# Patient Record
Sex: FEMALE | Race: WHITE | NOT HISPANIC OR LATINO | Employment: FULL TIME | URBAN - METROPOLITAN AREA
[De-identification: names, ages, dates, MRNs, and addresses within clinical notes are randomized per-mention and may not be internally consistent; named-entity substitution may affect disease eponyms.]

---

## 2017-08-29 ENCOUNTER — OFFICE VISIT (OUTPATIENT)
Dept: MEDICAL GROUP | Facility: MEDICAL CENTER | Age: 47
End: 2017-08-29
Payer: COMMERCIAL

## 2017-08-29 VITALS
DIASTOLIC BLOOD PRESSURE: 74 MMHG | HEIGHT: 61 IN | RESPIRATION RATE: 14 BRPM | HEART RATE: 60 BPM | OXYGEN SATURATION: 97 % | TEMPERATURE: 98 F | SYSTOLIC BLOOD PRESSURE: 122 MMHG | BODY MASS INDEX: 37.34 KG/M2 | WEIGHT: 197.8 LBS

## 2017-08-29 DIAGNOSIS — Z12.31 VISIT FOR SCREENING MAMMOGRAM: ICD-10-CM

## 2017-08-29 DIAGNOSIS — M79.89 BILATERAL SWELLING OF FEET: ICD-10-CM

## 2017-08-29 DIAGNOSIS — Z80.3 FAMILY HISTORY OF BREAST CANCER: ICD-10-CM

## 2017-08-29 DIAGNOSIS — Z76.89 ENCOUNTER TO ESTABLISH CARE: ICD-10-CM

## 2017-08-29 DIAGNOSIS — E66.9 OBESITY (BMI 35.0-39.9 WITHOUT COMORBIDITY): ICD-10-CM

## 2017-08-29 DIAGNOSIS — Z00.00 PREVENTATIVE HEALTH CARE: ICD-10-CM

## 2017-08-29 PROCEDURE — 99204 OFFICE O/P NEW MOD 45 MIN: CPT | Performed by: PHYSICIAN ASSISTANT

## 2017-08-29 RX ORDER — FUROSEMIDE 20 MG/1
20 TABLET ORAL DAILY
Qty: 28 TAB | Refills: 0 | Status: SHIPPED | OUTPATIENT
Start: 2017-08-29 | End: 2020-08-28

## 2017-08-29 ASSESSMENT — PAIN SCALES - GENERAL: PAINLEVEL: 5=MODERATE PAIN

## 2017-08-29 ASSESSMENT — PATIENT HEALTH QUESTIONNAIRE - PHQ9: CLINICAL INTERPRETATION OF PHQ2 SCORE: 0

## 2017-08-29 NOTE — PROGRESS NOTES
"Subjective:   Desiree Pratt is a 47 y.o. female here today forEstablishing care and for bilateral feet swelling for about 2 weeks.    Bilateral swelling of feet  This is a 47-year-old female complains of an approximately two-week history of bilateral feet swelling. Associated pain. She works most of the day. She missed work yesterday and today. She's been elevating her feet. She is exercising regularly. Denies any chest pain or shortness of breath. No coughing. No history of any medical disorders.    Family history of breast cancer  Mother  at 49 from breast cancer.       Current medicines (including changes today)  Current Outpatient Prescriptions   Medication Sig Dispense Refill   • Cyanocobalamin (VITAMELTS ENERGY VITAMIN B-12 PO) Take  by mouth.     • Ferrous Gluconate (IRON 27 PO) Take  by mouth.     • furosemide (LASIX) 20 MG Tab Take 1 Tab by mouth every day. 28 Tab 0     No current facility-administered medications for this visit.      She  has no past medical history on file.    ROS   No chest pain, no shortness of breath, no abdominal pain and all other systems were reviewed and are negative.       Objective:     Blood pressure 122/74, pulse 60, temperature 36.7 °C (98 °F), resp. rate 14, height 1.549 m (5' 1\"), weight 89.7 kg (197 lb 12.8 oz), last menstrual period 2017, SpO2 97 %, not currently breastfeeding. Body mass index is 37.37 kg/m².   Physical Exam:  Constitutional: Alert, no distress.  Skin: Warm, dry, good turgor, no rashes in visible areas.  Eye: Equal, round and reactive, conjunctiva clear, lids normal.  ENMT: Lips without lesions, good dentition, oropharynx clear.  Neck: Trachea midline, no masses.   Lymph: No cervical or supraclavicular lymphadenopathy  Respiratory: Unlabored respiratory effort, lungs clear to auscultation, no wheezes, no ronchi.  Cardiovascular: Normal S1, S2, no murmur, no Significant edema. No pitting edema.  Abdomen: Soft, non-tender, no masses.  Psych: Alert " and oriented x3, normal affect and mood.        Assessment and Plan:   The following treatment plan was discussed    1. Bilateral swelling of feet  Acute, new onset condition. No other symptoms noted. Advised to continue exercise routinely. Elevate as needed. Follow up with any worsening symptoms such as shortness of breath or coughing. Ordered labs fasting. Also provided Lasix 20 mg daily for 7 days in case edema returns. May continue also compression stockings.  - TSH; Future  - furosemide (LASIX) 20 MG Tab; Take 1 Tab by mouth every day.  Dispense: 28 Tab; Refill: 0    2. Encounter to establish care    3. Visit for screening mammogram  Ordered mammogram.  - MA-SCREEN MAMMO W/CAD-BILAT    4. Obesity (BMI 35.0-39.9 without comorbidity) ([facility])  Advised continued exercise. Ordered TSH.  - Patient identified as having weight management issue.  Appropriate orders and counseling given.  - TSH; Future    5. Preventative health care  Order labs fasting.  - COMP METABOLIC PANEL; Future  - HEMOGLOBIN A1C; Future  - LIPID PANEL  - TSH; Future  - CBC WITHOUT DIFFERENTIAL; Future    6. Family history of breast cancer  Obtain mammogram yearly.      Followup: Return if symptoms worsen or fail to improve.    Please note that this dictation was created using voice recognition software. I have made every reasonable attempt to correct obvious errors, but I expect that there are errors of grammar and possibly content that I did not discover before finalizing the note.

## 2017-08-29 NOTE — ASSESSMENT & PLAN NOTE
This is a 47-year-old female complains of an approximately two-week history of bilateral feet swelling. Associated pain. She works most of the day. She missed work yesterday and today. She's been elevating her feet. She is exercising regularly. Denies any chest pain or shortness of breath. No coughing. No history of any medical disorders.

## 2017-08-29 NOTE — LETTER
August 29, 2017         Patient: Desiree Pratt   YOB: 1970   Date of Visit: 8/29/2017           To Whom it May Concern:    Desiree Pratt was seen in my clinic on 8/29/2017. She may return to work on 8/30/17.    If you have any questions or concerns, please don't hesitate to call.        Sincerely,           Rashid Santiago P.A.-C.  Electronically Signed

## 2018-01-24 ENCOUNTER — OFFICE VISIT (OUTPATIENT)
Dept: URGENT CARE | Facility: PHYSICIAN GROUP | Age: 48
End: 2018-01-24
Payer: COMMERCIAL

## 2018-01-24 VITALS
SYSTOLIC BLOOD PRESSURE: 124 MMHG | OXYGEN SATURATION: 97 % | TEMPERATURE: 98 F | DIASTOLIC BLOOD PRESSURE: 74 MMHG | HEART RATE: 62 BPM | WEIGHT: 198 LBS | HEIGHT: 61 IN | BODY MASS INDEX: 37.38 KG/M2

## 2018-01-24 DIAGNOSIS — J01.40 ACUTE NON-RECURRENT PANSINUSITIS: ICD-10-CM

## 2018-01-24 DIAGNOSIS — H61.23 BILATERAL IMPACTED CERUMEN: ICD-10-CM

## 2018-01-24 PROCEDURE — 99214 OFFICE O/P EST MOD 30 MIN: CPT | Performed by: NURSE PRACTITIONER

## 2018-01-24 RX ORDER — DOXYCYCLINE HYCLATE 100 MG
100 TABLET ORAL 2 TIMES DAILY
Qty: 14 TAB | Refills: 0 | Status: SHIPPED | OUTPATIENT
Start: 2018-01-24 | End: 2018-01-31

## 2018-01-24 ASSESSMENT — ENCOUNTER SYMPTOMS
FEVER: 0
COUGH: 1
EYE PAIN: 0
DIZZINESS: 0
WHEEZING: 0
SHORTNESS OF BREATH: 1
NAUSEA: 0
SORE THROAT: 1
CHILLS: 1
MYALGIAS: 0
VOMITING: 0

## 2018-01-25 NOTE — PROGRESS NOTES
Subjective:     Desiree De La Paz is a 47 y.o. female who presents for Cough (and congestion X 2 months   )  Patient complaining of an on and off again hold since November. Patient complaining of congestion, headaches, fatigue, shortness of breath, cough. Patient denies any fevers, nausea, vomiting.     Cough   This is a new problem. The current episode started more than 1 month ago. The problem has been waxing and waning. The problem occurs every few hours. The cough is non-productive. Associated symptoms include chills, nasal congestion, a sore throat and shortness of breath. Pertinent negatives include no chest pain, fever, myalgias, rash or wheezing. Nothing aggravates the symptoms. She has tried OTC cough suppressant for the symptoms. The treatment provided no relief. There is no history of bronchitis or pneumonia.   History reviewed. No pertinent past medical history.History reviewed. No pertinent surgical history.  Social History     Social History   • Marital status:      Spouse name: N/A   • Number of children: N/A   • Years of education: N/A     Occupational History   • Not on file.     Social History Main Topics   • Smoking status: Never Smoker   • Smokeless tobacco: Never Used   • Alcohol use No   • Drug use: No   • Sexual activity: Not Currently     Partners: Male     Other Topics Concern   • Not on file     Social History Narrative   • No narrative on file      Family History   Problem Relation Age of Onset   • Cancer Mother 49     Breast CA   • Heart Disease Father     Review of Systems   Constitutional: Positive for chills and malaise/fatigue. Negative for fever.   HENT: Positive for congestion and sore throat.    Eyes: Negative for pain.   Respiratory: Positive for cough and shortness of breath. Negative for wheezing.    Cardiovascular: Negative for chest pain.   Gastrointestinal: Negative for nausea and vomiting.   Genitourinary: Negative for hematuria.   Musculoskeletal: Negative for  "myalgias.   Skin: Negative for rash.   Neurological: Negative for dizziness.   No Known Allergies   Objective:   /74   Pulse 62   Temp 36.7 °C (98 °F)   Ht 1.549 m (5' 1\")   Wt 89.8 kg (198 lb)   SpO2 97%   BMI 37.41 kg/m²   Physical Exam   Constitutional: She is oriented to person, place, and time. She appears well-developed and well-nourished. No distress.   HENT:   Head: Normocephalic and atraumatic.   Right Ear: Tympanic membrane normal. Tympanic membrane is not erythematous.   Left Ear: Tympanic membrane normal. Tympanic membrane is not erythematous.   Nose: Mucosal edema present. Right sinus exhibits maxillary sinus tenderness and frontal sinus tenderness. Left sinus exhibits maxillary sinus tenderness and frontal sinus tenderness.   Mouth/Throat: Uvula is midline, oropharynx is clear and moist and mucous membranes are normal. No posterior oropharyngeal edema, posterior oropharyngeal erythema or tonsillar abscesses. No tonsillar exudate.   Eyes: Conjunctivae and EOM are normal. Pupils are equal, round, and reactive to light. Right eye exhibits no discharge. Left eye exhibits no discharge.   Cardiovascular: Normal rate and regular rhythm.    No murmur heard.  Pulmonary/Chest: Effort normal and breath sounds normal. No respiratory distress.   Abdominal: Soft. She exhibits no distension. There is no tenderness.   Neurological: She is alert and oriented to person, place, and time. She has normal reflexes. No sensory deficit.   Skin: Skin is warm, dry and intact.   Psychiatric: She has a normal mood and affect.         Assessment/Plan:   Assessment    1. Bilateral impacted cerumen     2. Acute non-recurrent pansinusitis  doxycycline (VIBRAMYCIN) 100 MG Tab     Procedure: Cerumen Removal  Risks and benefits of procedure discussed  Cerumen removed with curette and lavage after softening agent instilled  Patient tolerated well  Post procedure exam with clear canal and normal TM    Lung sounds clear to " auscultation bilaterally, patient without fevers, PO2 adequate.  Advised to continue supportive care with Tylenol and/or ibuprofen for fevers and discomfort. Increased fluids and electrolytes.      Patient given precautionary s/sx that mandate immediate follow up and evaluation in the ED. Advised of risks of not doing so.    DDX, Supportive care, and indications for immediate follow-up discussed with patient.    Instructed to return to clinic or nearest emergency department if we are not available for any change in condition, further concerns, or worsening of symptoms.    The patient demonstrated a good understanding and agreed with the treatment plan.

## 2020-05-23 ENCOUNTER — OFFICE VISIT (OUTPATIENT)
Dept: URGENT CARE | Facility: CLINIC | Age: 50
End: 2020-05-23
Payer: COMMERCIAL

## 2020-05-23 VITALS
OXYGEN SATURATION: 97 % | HEART RATE: 78 BPM | SYSTOLIC BLOOD PRESSURE: 132 MMHG | HEIGHT: 61 IN | TEMPERATURE: 97.4 F | RESPIRATION RATE: 14 BRPM | BODY MASS INDEX: 33.99 KG/M2 | WEIGHT: 180 LBS | DIASTOLIC BLOOD PRESSURE: 80 MMHG

## 2020-05-23 DIAGNOSIS — J02.9 PHARYNGITIS, UNSPECIFIED ETIOLOGY: Primary | ICD-10-CM

## 2020-05-23 PROCEDURE — 99213 OFFICE O/P EST LOW 20 MIN: CPT | Performed by: FAMILY MEDICINE

## 2020-05-23 RX ORDER — AMOXICILLIN 500 MG/1
500 CAPSULE ORAL 2 TIMES DAILY
Qty: 20 CAP | Refills: 0 | Status: SHIPPED | OUTPATIENT
Start: 2020-05-23 | End: 2020-08-28

## 2020-05-23 ASSESSMENT — ENCOUNTER SYMPTOMS
NECK PAIN: 0
CONSTITUTIONAL NEGATIVE: 1
VOMITING: 0
STRIDOR: 0
COUGH: 0
HOARSE VOICE: 0
HEADACHES: 0
TROUBLE SWALLOWING: 0
DIARRHEA: 0
ABDOMINAL PAIN: 0
SWOLLEN GLANDS: 1
SHORTNESS OF BREATH: 0
SORE THROAT: 1

## 2020-05-23 NOTE — PATIENT INSTRUCTIONS
Pharyngitis  Pharyngitis is redness, pain, and swelling (inflammation) of your pharynx.  What are the causes?  Pharyngitis is usually caused by infection. Most of the time, these infections are from viruses (viral) and are part of a cold. However, sometimes pharyngitis is caused by bacteria (bacterial). Pharyngitis can also be caused by allergies. Viral pharyngitis may be spread from person to person by coughing, sneezing, and personal items or utensils (cups, forks, spoons, toothbrushes). Bacterial pharyngitis may be spread from person to person by more intimate contact, such as kissing.  What are the signs or symptoms?  Symptoms of pharyngitis include:  · Sore throat.  · Tiredness (fatigue).  · Low-grade fever.  · Headache.  · Joint pain and muscle aches.  · Skin rashes.  · Swollen lymph nodes.  · Plaque-like film on throat or tonsils (often seen with bacterial pharyngitis).  How is this diagnosed?  Your health care provider will ask you questions about your illness and your symptoms. Your medical history, along with a physical exam, is often all that is needed to diagnose pharyngitis. Sometimes, a rapid strep test is done. Other lab tests may also be done, depending on the suspected cause.  How is this treated?  Viral pharyngitis will usually get better in 3-4 days without the use of medicine. Bacterial pharyngitis is treated with medicines that kill germs (antibiotics).  Follow these instructions at home:  · Drink enough water and fluids to keep your urine clear or pale yellow.  · Only take over-the-counter or prescription medicines as directed by your health care provider:  ¨ If you are prescribed antibiotics, make sure you finish them even if you start to feel better.  ¨ Do not take aspirin.  · Get lots of rest.  · Gargle with 8 oz of salt water (½ tsp of salt per 1 qt of water) as often as every 1-2 hours to soothe your throat.  · Throat lozenges (if you are not at risk for choking) or sprays may be used to  soothe your throat.  Contact a health care provider if:  · You have large, tender lumps in your neck.  · You have a rash.  · You cough up green, yellow-brown, or bloody spit.  Get help right away if:  · Your neck becomes stiff.  · You drool or are unable to swallow liquids.  · You vomit or are unable to keep medicines or liquids down.  · You have severe pain that does not go away with the use of recommended medicines.  · You have trouble breathing (not caused by a stuffy nose).  This information is not intended to replace advice given to you by your health care provider. Make sure you discuss any questions you have with your health care provider.  Document Released: 12/18/2006 Document Revised: 05/25/2017 Document Reviewed: 08/25/2014  [employer] Interactive Patient Education © 2017 [employer]

## 2020-05-23 NOTE — PROGRESS NOTES
"Subjective:      Desiree De La Paz is a 50 y.o. female who presents with Pharyngitis (x 4 days )            Pharyngitis    This is a new problem. The current episode started in the past 7 days. The problem has been gradually worsening. The pain is worse on the left side. There has been no fever. The pain is at a severity of 2/10. The pain is mild. Associated symptoms include swollen glands. Pertinent negatives include no abdominal pain, congestion, coughing, diarrhea, drooling, ear discharge, ear pain, headaches, hoarse voice, plugged ear sensation, neck pain, shortness of breath, stridor, trouble swallowing or vomiting. She has had no exposure to strep or mono. She has tried nothing for the symptoms. The treatment provided no relief.       Review of Systems   Constitutional: Negative.    HENT: Positive for sore throat. Negative for congestion, drooling, ear discharge, ear pain, hoarse voice and trouble swallowing.    Respiratory: Negative for cough, shortness of breath and stridor.    Gastrointestinal: Negative for abdominal pain, diarrhea and vomiting.   Musculoskeletal: Negative for neck pain.   Neurological: Negative for headaches.   All other systems reviewed and are negative.         Objective:     /80   Pulse 78   Temp 36.3 °C (97.4 °F)   Resp 14   Ht 1.549 m (5' 1\")   Wt 81.6 kg (180 lb)   SpO2 97%   BMI 34.01 kg/m²      Physical Exam  Vitals signs reviewed.   Constitutional:       General: She is not in acute distress.     Appearance: Normal appearance. She is normal weight. She is not ill-appearing, toxic-appearing or diaphoretic.   HENT:      Head: Normocephalic and atraumatic.      Right Ear: Tympanic membrane normal.      Left Ear: Tympanic membrane normal.      Nose: Nose normal. No congestion or rhinorrhea.      Mouth/Throat:      Pharynx: Posterior oropharyngeal erythema present. No oropharyngeal exudate.      Comments: Lateral enlarged tonsil, left more than right  Eyes:      " Extraocular Movements: Extraocular movements intact.   Neck:      Musculoskeletal: Normal range of motion.   Cardiovascular:      Rate and Rhythm: Normal rate.      Pulses: Normal pulses.   Pulmonary:      Effort: Pulmonary effort is normal.   Skin:     General: Skin is warm.      Capillary Refill: Capillary refill takes less than 2 seconds.   Neurological:      Mental Status: She is alert.   Psychiatric:         Mood and Affect: Mood normal.                 Assessment/Plan:       1. Pharyngitis, unspecified etiology  1. Pharyngitis, unspecified etiology  amoxicillin (AMOXIL) 500 MG Cap       - amoxicillin (AMOXIL) 500 MG Cap; Take 1 Cap by mouth 2 times a day.  Dispense: 20 Cap; Refill: 0    , Use salt water Tylenol ibuprofen as needed use tea and honey,  Back to clinic if your symptoms worsen

## 2020-08-28 ENCOUNTER — APPOINTMENT (OUTPATIENT)
Dept: MEDICAL GROUP | Facility: MEDICAL CENTER | Age: 50
End: 2020-08-28
Payer: COMMERCIAL

## 2020-08-28 ENCOUNTER — TELEMEDICINE (OUTPATIENT)
Dept: MEDICAL GROUP | Facility: MEDICAL CENTER | Age: 50
End: 2020-08-28

## 2020-08-28 VITALS — WEIGHT: 176 LBS | HEIGHT: 61 IN | BODY MASS INDEX: 33.23 KG/M2 | RESPIRATION RATE: 16 BRPM

## 2020-08-28 DIAGNOSIS — Z12.31 ENCOUNTER FOR SCREENING MAMMOGRAM FOR BREAST CANCER: ICD-10-CM

## 2020-08-28 DIAGNOSIS — Z00.00 ANNUAL PHYSICAL EXAM: ICD-10-CM

## 2020-08-28 DIAGNOSIS — Z12.11 SCREENING FOR COLON CANCER: ICD-10-CM

## 2020-08-28 PROCEDURE — 99396 PREV VISIT EST AGE 40-64: CPT | Mod: 95,CR | Performed by: PHYSICIAN ASSISTANT

## 2020-08-28 NOTE — ASSESSMENT & PLAN NOTE
This is a pleasant 50-year-old female on a virtual visit.  Requesting an annual physical.  No new complaints today.  Requesting labs as well as a mammogram.  No family history of colon cancer.  In the past has been screened with FIT testing.  Last labs were ordered in 2017 but not performed.  She was concerned as she would soon be considered a new patient.

## 2020-08-28 NOTE — PROGRESS NOTES
"Subjective:   Desiree De La Paz is a 50 y.o. female here today for annual physical.    This evaluation was conducted via [employer] using secure and encrypted videoconferencing technology. The patient was in a private location in the [facility].    The patient's identity was confirmed and verbal consent was obtained for this virtual visit.      Annual physical exam  This is a pleasant 50-year-old female on a virtual visit.  Requesting an annual physical.  No new complaints today.  Requesting labs as well as a mammogram.  No family history of colon cancer.  In the past has been screened with FIT testing.  Last labs were ordered in 2017 but not performed.  She was concerned as she would soon be considered a new patient.         Current medicines (including changes today)  No current outpatient medications on file.     No current facility-administered medications for this visit.      She  has no past medical history on file.    Social History and Family History were reviewed and updated.    ROS   No chest pain, no shortness of breath, no abdominal pain and all other systems were reviewed and are negative.       Objective:     Resp 16   Ht 1.549 m (5' 1\")   Wt 79.8 kg (176 lb)  Body mass index is 33.25 kg/m².   Physical Exam:  Constitutional: Alert, no distress.  Skin: Warm, dry, good turgor, no rashes in visible areas.  Eye: Equal, round and reactive, conjunctiva clear, lids normal.  ENMT: Lips without lesions, good dentition, oropharynx clear.  Neck: Trachea midline, no masses.   Lymph: No cervical or supraclavicular lymphadenopathy  Respiratory: Unlabored respiratory effort.  Psych: Alert and oriented x3, normal affect and mood.        Assessment and Plan:   The following treatment plan was discussed    1. Annual physical exam  Order labs fasting.  Should be contacted with the results.  Discussed Pap smear.  In follow-up will likely make her an appointment with Cherelle Ware.  - HEMOGLOBIN A1C; Future  - Comp " Metabolic Panel; Future  - Lipid Profile; Future  - VITAMIN D,25 HYDROXY; Future  - CBC WITH DIFFERENTIAL; Future  - TSH WITH REFLEX TO FT4; Future    2. Encounter for screening mammogram for breast cancer  Mammogram ordered.  Contact the imaging.  Number provided.  - MA-SCREENING MAMMO BILAT W/TOMOSYNTHESIS W/CAD; Future    3. Screening for colon cancer  Order Cologuard.  Discussed testing.  Faxed over to [facility]olog[facility].  - COLOGUARD (FIT DNA)      Followup: Return if symptoms worsen or fail to improve, for May need appointment after labs.    Please note that this dictation was created using voice recognition software. I have made every reasonable attempt to correct obvious errors, but I expect that there are errors of grammar and possibly content that I did not discover before finalizing the note.

## 2020-08-29 ENCOUNTER — HOSPITAL ENCOUNTER (OUTPATIENT)
Dept: RADIOLOGY | Facility: MEDICAL CENTER | Age: 50
End: 2020-08-29
Attending: PHYSICIAN ASSISTANT
Payer: COMMERCIAL

## 2020-08-29 DIAGNOSIS — Z12.31 ENCOUNTER FOR SCREENING MAMMOGRAM FOR BREAST CANCER: ICD-10-CM

## 2020-08-29 PROCEDURE — 77067 SCR MAMMO BI INCL CAD: CPT

## 2020-09-09 ENCOUNTER — HOSPITAL ENCOUNTER (OUTPATIENT)
Dept: LAB | Facility: MEDICAL CENTER | Age: 50
End: 2020-09-09
Attending: PHYSICIAN ASSISTANT
Payer: COMMERCIAL

## 2020-09-09 DIAGNOSIS — Z00.00 ANNUAL PHYSICAL EXAM: ICD-10-CM

## 2020-09-09 LAB
25(OH)D3 SERPL-MCNC: 16 NG/ML (ref 30–100)
ALBUMIN SERPL BCP-MCNC: 4.1 G/DL (ref 3.2–4.9)
ALBUMIN/GLOB SERPL: 1.5 G/DL
ALP SERPL-CCNC: 52 U/L (ref 30–99)
ALT SERPL-CCNC: 6 U/L (ref 2–50)
ANION GAP SERPL CALC-SCNC: 10 MMOL/L (ref 7–16)
AST SERPL-CCNC: 10 U/L (ref 12–45)
BASOPHILS # BLD AUTO: 0.7 % (ref 0–1.8)
BASOPHILS # BLD: 0.05 K/UL (ref 0–0.12)
BILIRUB SERPL-MCNC: 0.2 MG/DL (ref 0.1–1.5)
BUN SERPL-MCNC: 11 MG/DL (ref 8–22)
CALCIUM SERPL-MCNC: 9.2 MG/DL (ref 8.5–10.5)
CHLORIDE SERPL-SCNC: 102 MMOL/L (ref 96–112)
CHOLEST SERPL-MCNC: 158 MG/DL (ref 100–199)
CO2 SERPL-SCNC: 22 MMOL/L (ref 20–33)
CREAT SERPL-MCNC: 0.66 MG/DL (ref 0.5–1.4)
EOSINOPHIL # BLD AUTO: 0.04 K/UL (ref 0–0.51)
EOSINOPHIL NFR BLD: 0.5 % (ref 0–6.9)
ERYTHROCYTE [DISTWIDTH] IN BLOOD BY AUTOMATED COUNT: 42.7 FL (ref 35.9–50)
FASTING STATUS PATIENT QL REPORTED: NORMAL
GLOBULIN SER CALC-MCNC: 2.7 G/DL (ref 1.9–3.5)
GLUCOSE SERPL-MCNC: 94 MG/DL (ref 65–99)
HCT VFR BLD AUTO: 41.1 % (ref 37–47)
HDLC SERPL-MCNC: 35 MG/DL
HGB BLD-MCNC: 13 G/DL (ref 12–16)
IMM GRANULOCYTES # BLD AUTO: 0.02 K/UL (ref 0–0.11)
IMM GRANULOCYTES NFR BLD AUTO: 0.3 % (ref 0–0.9)
LDLC SERPL CALC-MCNC: 96 MG/DL
LYMPHOCYTES # BLD AUTO: 2.13 K/UL (ref 1–4.8)
LYMPHOCYTES NFR BLD: 28.2 % (ref 22–41)
MCH RBC QN AUTO: 27.5 PG (ref 27–33)
MCHC RBC AUTO-ENTMCNC: 31.6 G/DL (ref 33.6–35)
MCV RBC AUTO: 86.9 FL (ref 81.4–97.8)
MONOCYTES # BLD AUTO: 0.44 K/UL (ref 0–0.85)
MONOCYTES NFR BLD AUTO: 5.8 % (ref 0–13.4)
NEUTROPHILS # BLD AUTO: 4.88 K/UL (ref 2–7.15)
NEUTROPHILS NFR BLD: 64.5 % (ref 44–72)
NRBC # BLD AUTO: 0 K/UL
NRBC BLD-RTO: 0 /100 WBC
PLATELET # BLD AUTO: 410 K/UL (ref 164–446)
PMV BLD AUTO: 10.2 FL (ref 9–12.9)
POTASSIUM SERPL-SCNC: 4.1 MMOL/L (ref 3.6–5.5)
PROT SERPL-MCNC: 6.8 G/DL (ref 6–8.2)
RBC # BLD AUTO: 4.73 M/UL (ref 4.2–5.4)
SODIUM SERPL-SCNC: 134 MMOL/L (ref 135–145)
TRIGL SERPL-MCNC: 135 MG/DL (ref 0–149)
TSH SERPL DL<=0.005 MIU/L-ACNC: 2.72 UIU/ML (ref 0.38–5.33)
WBC # BLD AUTO: 7.6 K/UL (ref 4.8–10.8)

## 2020-09-09 PROCEDURE — 85025 COMPLETE CBC W/AUTO DIFF WBC: CPT

## 2020-09-09 PROCEDURE — 82306 VITAMIN D 25 HYDROXY: CPT

## 2020-09-09 PROCEDURE — 83036 HEMOGLOBIN GLYCOSYLATED A1C: CPT

## 2020-09-09 PROCEDURE — 36415 COLL VENOUS BLD VENIPUNCTURE: CPT

## 2020-09-09 PROCEDURE — 84443 ASSAY THYROID STIM HORMONE: CPT

## 2020-09-09 PROCEDURE — 80053 COMPREHEN METABOLIC PANEL: CPT

## 2020-09-09 PROCEDURE — 80061 LIPID PANEL: CPT

## 2020-09-10 PROBLEM — E55.9 VITAMIN D DEFICIENCY: Status: ACTIVE | Noted: 2020-09-10

## 2020-09-10 LAB
EST. AVERAGE GLUCOSE BLD GHB EST-MCNC: 105 MG/DL
HBA1C MFR BLD: 5.3 % (ref 0–5.6)

## 2023-08-24 ENCOUNTER — PATIENT MESSAGE (OUTPATIENT)
Dept: HEALTH INFORMATION MANAGEMENT | Facility: OTHER | Age: 53
End: 2023-08-24

## 2025-06-05 ENCOUNTER — OCCUPATIONAL MEDICINE (OUTPATIENT)
Dept: URGENT CARE | Facility: CLINIC | Age: 55
End: 2025-06-05
Payer: COMMERCIAL

## 2025-06-05 ENCOUNTER — APPOINTMENT (OUTPATIENT)
Dept: RADIOLOGY | Facility: IMAGING CENTER | Age: 55
End: 2025-06-05
Payer: COMMERCIAL

## 2025-06-05 VITALS
RESPIRATION RATE: 16 BRPM | TEMPERATURE: 97.8 F | DIASTOLIC BLOOD PRESSURE: 68 MMHG | OXYGEN SATURATION: 96 % | HEART RATE: 64 BPM | HEIGHT: 61 IN | WEIGHT: 196 LBS | SYSTOLIC BLOOD PRESSURE: 120 MMHG | BODY MASS INDEX: 37 KG/M2

## 2025-06-05 DIAGNOSIS — S49.91XA SHOULDER INJURY, RIGHT, INITIAL ENCOUNTER: ICD-10-CM

## 2025-06-05 DIAGNOSIS — M25.511 ACUTE PAIN OF RIGHT SHOULDER: Primary | ICD-10-CM

## 2025-06-05 PROCEDURE — 3078F DIAST BP <80 MM HG: CPT

## 2025-06-05 PROCEDURE — 3074F SYST BP LT 130 MM HG: CPT

## 2025-06-05 PROCEDURE — 99203 OFFICE O/P NEW LOW 30 MIN: CPT

## 2025-06-05 ASSESSMENT — ENCOUNTER SYMPTOMS: FEVER: 0

## 2025-06-05 NOTE — LETTER
"  EMPLOYEE’S CLAIM FOR COMPENSATION/ REPORT OF INITIAL TREATMENT  FORM C-4  PLEASE TYPE OR PRINT    EMPLOYEE’S CLAIM - PROVIDE ALL INFORMATION REQUESTED   First Name                    ELADIA Ramírez                  Last Name  Dhiraj Pratt Birthdate                    1970                Sex  [x]Female Claim Number (Insurer’s Use Only)     Mailing Address  [address] Age  55 y.o. 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 (if applicable)  Unclogging a floor drain    How did this injury or occupational disease occur? (Be specific and answer in detail. Use additional sheet if necessary)  my employee burned a pot of spaghetti sauce, I told her to go dump it down the floor drain in the power hose area. She came back & said she clogged the drain, I went to clean the drain out when I scooped the water out of the floor drain I put it in a bucket. After I emptied the drain I picked up the bucket to dump it elsewhere, when I picked up the bucket something in my shoulder went pop. I have had pain & limited movement ever since.  I has not gotten any " better.   If you believe that you have an occupational disease, when did you first have knowledge of the disability and its relationship to your employment?  N/a Witnesses to the Accident (if applicable)  none      Nature of Injury or Occupational Disease  Strain  Part(s) of Body Injured or Affected  Shoulder (R) N/A N/A    I CERTIFY THAT THE ABOVE IS TRUE AND CORRECT TO T HE BEST OF MY KNOWLEDGE AND THAT I HAVE PROVIDED THIS INFORMATION IN ORDER TO OBTAIN THE BENEFITS OF [place] OR [place]  I HEREBY AUTHORIZE ANY PHYSICIAN, CHIROPRACTOR, SURGEON, PRACTITIONER OR ANY OTHER PERSON, ANY HOSPITAL, INCLUDING [facility] OR [facility], ANY  MEDICAL SERVICE ORGANIZATION, ANY INSURANCE COMPANY, OR OTHER INSTITUTION OR ORGANIZATION TO RELEASE TO EACH OTHER, ANY MEDICAL OR OTHER INFORMATION, INCLUDING BENEFITS PAID OR PAYABLE, PERTINENT TO THIS INJURY OR DISEASE, EXCEPT INFORMATION RELATIVE TO DIAGNOSIS, TREATMENT AND/OR COUNSELING FOR AIDS, PSYCHOLOGICAL CONDITIONS, ALCOHOL OR CONTROLLED SUBSTANCES, FOR WHICH I MUST GIVE SPECIFIC AUTHORIZATION.  A PHOTOSTAT OF THIS AUTHORIZATION SHALL BE VALID AS THE ORIGINAL.     Date   Place Employee’s Original or  *Electronic Signature   THIS REPORT MUST BE COMPLETED AND MAILED WITHIN 3 WORKING DAYS OF TREATMENT   Place  [facility]    Name of Facility  [facility]   Date 6/5/2025 Diagnosis and Description of Injury or Occupational Disease  (M25.511) Acute pain of right shoulder  (primary encounter diagnosis)  (S49.91XA) Shoulder injury, right, initial encounter  The primary encounter diagnosis was Acute pain of right shoulder. A diagnosis of Shoulder injury, right, initial encounter was also pertinent to this visit. Is there evidence that the injured employee was under the influence of alcohol and/or another controlled substance at the time of accident?  []No   [] Yes (if yes, please explain)   Hour 5:44 PM      Treatment:      Have you advised the patient to remain off work five days or more?      [] Yes  If yes, indicate dates: From_ _                                                      To __ _  [] No   If no, is the injured employee capable of: [] full duty     [] modified duty      If modified duty, specify any limitations / restrictions:__________________  ___ ___________________________     X-Ray Findings:      From information given by the employee, together with medical evidence, can you directly connect this injury or occupational disease as job incurred?  []Yes   [] No      Is additional medical care by a physician indicated? []Yes [] No       Do you know of any previous injury or disease contributing to this condition or occupational disease? []Yes [] No (Explain if yes)                              Date  6/5/2025 Print Health Care Provider’s Name  JOHANNE Arizmendi I certify that the employer’s copy of  this form was delivered to the employer on:   Address  [address] [place] [address] INSURER'S USE ONLY                       [facility] Zip  [address] Provider’s Tax ID Number  [id number]   Telephone  Dept: [phone]    Health Care Provider’s Original or Electronic Signature      [email or web address]    Degree (MD,DO, DC,PA-C,APRN)  APRN  Choose (if applicable)      ORIGINAL - TREATING HEALTHCARE PROVIDER PAGE 2 - INSURER/TPA PAGE 3 - EMPLOYER PAGE 4 - EMPLOYEE             Form C-4 (rev.02/25)

## 2025-06-05 NOTE — LETTER
"  EMPLOYEE’S CLAIM FOR COMPENSATION/ REPORT OF INITIAL TREATMENT  FORM C-4  PLEASE TYPE OR PRINT    EMPLOYEE’S CLAIM - PROVIDE ALL INFORMATION REQUESTED   First Name                    ELADIA Ramírez                  Last Name  Dhiraj Pratt Birthdate                    1970                Sex  [x]Female Claim Number (Insurer’s Use Only)     Mailing Address  [address] Age  55 y.o. Height  1.549 m (5' 1\") Weight  88.9 kg (196 lb) Social Security Number     [facility]  [address] Telephone  [phone] (home)    Email  [email or web address]    Primary Language Spoken  English    INSURER   THIRD-PARTY       Employee's Occupation (Job Title) When Injury or Occupational Disease Occurred      Employer's Name/Company Name     Telephone  [phone]    Office Mail Address (Number and Street)  [address] [facility] [address] [facility]     Date of Injury (if applicable)                Hours Injury (if applicable)   am    pm Date Employer Notified   Last Day of Work after Injury or Occupational Disease   Supervisor to Whom Injury Reported     Address or Location of Accident (if applicable)     What were you doing at the time of accident? (if applicable)      How did this injury or occupational disease occur? (Be specific and answer in detail. Use additional sheet if necessary)     If you believe that you have an occupational disease, when did you first have knowledge of the disability and its relationship to your employment?   Witnesses to the Accident (if applicable)        Nature of Injury or Occupational Disease    Part(s) of Body Injured or Affected        I CERTIFY THAT THE ABOVE IS TRUE AND CORRECT TO T HE BEST OF MY KNOWLEDGE AND THAT I HAVE PROVIDED THIS INFORMATION IN ORDER TO OBTAIN THE BENEFITS OF [place]CC[place], OR CHAPTER 617 OF " [place]).  I HEREBY AUTHORIZE ANY PHYSICIAN, CHIROPRACTOR, SURGEON, PRACTITIONER OR ANY OTHER PERSON, ANY HOSPITAL, INCLUDING [facility] OR [facility], ANY  MEDICAL SERVICE ORGANIZATION, ANY INSURANCE COMPANY, OR OTHER INSTITUTION OR ORGANIZATION TO RELEASE TO EACH OTHER, ANY MEDICAL OR OTHER INFORMATION, INCLUDING BENEFITS PAID OR PAYABLE, PERTINENT TO THIS INJURY OR DISEASE, EXCEPT INFORMATION RELATIVE TO DIAGNOSIS, TREATMENT AND/OR COUNSELING FOR AIDS, PSYCHOLOGICAL CONDITIONS, ALCOHOL OR CONTROLLED SUBSTANCES, FOR WHICH I MUST GIVE SPECIFIC AUTHORIZATION.  A PHOTOSTAT OF THIS AUTHORIZATION SHALL BE VALID AS THE ORIGINAL.     Date   Place Employee’s Original or  *Electronic Signature   THIS REPORT MUST BE COMPLETED AND MAILED WITHIN 3 WORKING DAYS OF TREATMENT   Place  [facility]    Name of Facility  [facility]   Date 6/5/2025 Diagnosis and Description of Injury or Occupational Disease  (M25.511) Acute pain of right shoulder  (primary encounter diagnosis)  (S49.91XA) Shoulder injury, right, initial encounter  The primary encounter diagnosis was Acute pain of right shoulder. A diagnosis of Shoulder injury, right, initial encounter was also pertinent to this visit. Is there evidence that the injured employee was under the influence of alcohol and/or another controlled substance at the time of accident?  []No  [] Yes (if yes, please explain)   Hour 5:44 PM      Treatment:      Have you advised the patient to remain off work five days or more?      [] Yes  If yes, indicate dates: From_ _                                                      To __ _  [] No   If no, is the injured employee capable of: [] full duty     [] modified duty      If modified duty, specify any limitations / restrictions:__________________  ___ ___________________________     X-Ray Findings:      From information given by the employee, together with medical evidence, can you directly connect this injury or  occupational disease as job incurred?  []Yes   [] No      Is additional medical care by a physician indicated? []Yes [] No       Do you know of any previous injury or disease contributing to this condition or occupational disease? []Yes [] No (Explain if yes)                              Date  6/5/2025 Print Health Care Provider’s Name  JOHANNE Arizmendi I certify that the employer’s copy of  this form was delivered to the employer on:   Address  [address] [place] [address] INSURER'S USE ONLY                       [facility]  [address] Provider’s Tax ID Number  [id number]   Telephone  Dept: [phone]    Health Care Provider’s Original or Electronic Signature      e-WHIT FoleyRWESLEY    Degree (MD,DO, DC,PA-C,APRN)  APRN  Choose (if applicable)      ORIGINAL - TREATING HEALTHCARE PROVIDER PAGE 2 - INSURER/TPA PAGE 3 - EMPLOYER PAGE 4 - EMPLOYEE             Form C-4 (rev.02/25)

## 2025-06-05 NOTE — LETTER
PHYSICIAN’S AND CHIROPRACTIC PHYSICIAN'S   PROGRESS REPORT   CERTIFICATION OF DISABILITY Claim Number:     Social Security Number:    Patient’s Name: Desiree Pratt Date of Injury:    Employer:   Name of MCO (if applicable):      Patient’s Job Description/Occupation:        Previous Injuries/Diseases/Surgeries Contributing to the Condition:  None      Diagnosis: (M25.511) Acute pain of right shoulder  (primary encounter diagnosis)  (S49.91XA) Shoulder injury, right, initial encounter      Related to the Industrial Injury? Yes     Explain: Patient reports that while cleaning up a floor drain, and while scooping out food debris with spoon she felt a popping sensation followed by sudden pain to her right shoulder and right bicep. Reports occasional tingling with movement. No numbness. Patient is right hand dominant.       Objective Medical Findings: Physical Exam  Vitals reviewed.   Constitutional:       Appearance: Normal appearance.   Musculoskeletal:      Right shoulder: Tenderness present. No swelling, deformity or crepitus. Decreased range of motion. Normal strength. Normal pulse.      Comments: Right shoulder  General: no gross deformity  ROM: flex 100, ER 40  Rotator Cuff: Empty can -  Impingement: Stanford -  Biceps: Speed's -  Neuro: Sensation intact    Neurological:      Mental Status: She is alert.              None - Discharged                         Stable  No                 Ratable  No        Generally Improved                         Condition Worsened                  Condition Same  May Have Suffered a Permanent Disability No     Treatment Plan:    Tylenol Motrin as needed.  Sling for support and comfort.  Work restrictions as follows.         No Change in Therapy                  PT/OT Prescribed                      Medication May be Used While Working        Case Management                          PT/OT Discontinued    Consultation    Further Diagnostic Studies:     Prescription(s)                 Released to FULL DUTY /No Restrictions on (Date):       Certified TOTALLY TEMPORARILY DISABLED (Indicate Dates) From:   To:    X  Released to RESTRICTED/Modified Duty on (Date): From: 6/5/2025 To: 6/12/2025  Restrictions Are:         No Sitting    No Standing    No Pulling Other: No pushing, pulling, carrying or lifting anything greater than 10 pounds.  No climbing.       No Bending at Waist     No Stooping     No Lifting        No Carrying     No Walking Lifting Restricted to (lbs.):  < or = to 10 pounds       No Pushing     X   No Climbing     No Reaching Above Shoulders       Date of Next Visit:  6/12/2025 Date of this Exam: 6/5/2025 Physician/Chiropractic Physician Name: JOHANNE Arizmendi Physician/Chiropractic Physician Signature:  Jon Bowden DO MPH     [place]:  [address] [place] [address], [address] [place], [place] [address] - Telephone [phone] [place]:  [address] [place] [address], [address] [place], [place] [address] - Telephone [phone]    [email or web address]  D-39 (Rev. 10/24)

## 2025-06-06 NOTE — PROGRESS NOTES
CHIEF COMPLAINT  Chief Complaint   Patient presents with    Work-Related Injury     New WC, DOI 05/26/25, R shoulder, emptying out clogged sink into a bucket and went to  bucket and heard a pop, pain     Subjective:   Desiree Pratt is a 55 y.o. female who presents to urgent care with concerns for work-related injury.  Patient reports that on 5/26/2025 she began experiencing right-sided shoulder pain after scooping out food debris from a clogged floor drain.  Patient reports while removing debris she felt a sudden pop as well as a sharp pain to her right shoulder.  She reports that pain radiates down to her right bicep.  Does endorse occasional tingling with movement.  No numbness.  Patient is right-hand dominant.  No previous history of shoulder injury.  No second job    Review of Systems   Constitutional:  Negative for fever.   Musculoskeletal:  Positive for joint pain.       PAST MEDICAL HISTORY  Patient Active Problem List    Diagnosis Date Noted    Vitamin D deficiency 09/10/2020    Annual physical exam 08/28/2020    Bilateral swelling of feet 08/29/2017    Obesity (BMI 35.0-39.9 without comorbidity) 08/29/2017    Family history of breast cancer 08/29/2017       SURGICAL HISTORY  patient denies any surgical history    ALLERGIES  Allergies[1]    CURRENT MEDICATIONS  Home Medications       Reviewed by Jovi Montgomery'abbe (Medical Assistant) on 06/05/25 at 1744  Med List Status: <None>     Medication Last Dose Status        Patient Gonzalo Taking any Medications                           SOCIAL HISTORY  Social History     Tobacco Use    Smoking status: Never    Smokeless tobacco: Never   Vaping Use    Vaping status: Never Used   Substance and Sexual Activity    Alcohol use: Never    Drug use: Never    Sexual activity: Not Currently     Partners: Male       FAMILY HISTORY  Family History   Problem Relation Age of Onset    Cancer Mother 49        Breast CA    Heart Disease Father   "        Medications, Allergies, and current problem list reviewed today in Epic.     Objective:     /68   Pulse 64   Temp 36.6 °C (97.8 °F) (Temporal)   Resp 16   Ht 1.549 m (5' 1\")   Wt 88.9 kg (196 lb)   SpO2 96%     Physical Exam  Vitals reviewed.   Constitutional:       General: She is not in acute distress.     Appearance: Normal appearance. She is not ill-appearing or toxic-appearing.   HENT:      Head: Normocephalic.   Cardiovascular:      Rate and Rhythm: Normal rate.      Pulses: Normal pulses.   Musculoskeletal:      Right shoulder: Tenderness present. No swelling, deformity or crepitus. Decreased range of motion. Normal strength. Normal pulse.      Comments: Right shoulder  General: no gross deformity  ROM: flex 100, ER 40  Rotator Cuff: Empty can -  Impingement: Stanford -  Biceps: Speed's -  Neuro: Sensation intact    Skin:     General: Skin is warm.   Neurological:      Mental Status: She is alert.   Psychiatric:         Mood and Affect: Mood normal.       RADIOLOGY RESULTS   DX-SHOULDER 2+ RIGHT  Result Date: 6/5/2025 6/5/2025 6:07 PM HISTORY/REASON FOR EXAM:  Pain/Deformity Following Trauma. RIGHT shoulder strain. TECHNIQUE/EXAM DESCRIPTION AND NUMBER OF VIEWS:  3 views of the RIGHT shoulder. COMPARISON: None FINDINGS: Clavicle is intact.  AC joint is preserved. Visualized proximal humerus is intact and normally located. Visualized RIGHT chest is unremarkable.     No fracture or dislocation of RIGHT shoulder.          Assessment/Plan:     Diagnosis and associated orders:     1. Acute pain of right shoulder  DX-SHOULDER 2+ RIGHT      2. Shoulder injury, right, initial encounter  Referral to Orthopedics         Comments/MDM:     Patient presents to urgent care for Workmen's Comp. injury.  Right shoulder pain since 5/26/2025.  Physical exam reveals pain with both flexion and external rotation.  Movement is limited due to pain.  No gross deformity to shoulder or bicep appreciated.  " Neurovascular intact.  X-ray reveals no acute osseous abnormality.  Patient placed in sling for support and comfort.  Advised on attempting gentle range of motion throughout the day.  Tylenol and Motrin as needed.  Referral to orthopedics for further evaluation and management.  Work restrictions include no pushing, pulling, lifting, carrying anything greater than 10 pounds.  No climbing.  Follow-up in 1 week, or sooner if needed.         Differential diagnosis, natural history, supportive care, and indications for immediate follow-up discussed.    Advised the patient to follow-up with the primary care physician for recheck, reevaluation, and consideration of further management.    Please note that this dictation was created using voice recognition software. I have made a reasonable attempt to correct obvious errors, but I expect that there are errors of grammar and possibly content that I did not discover before finalizing the note.    This note was electronically signed by JOHANNE Arizmendi       [1] No Known Allergies

## 2025-06-17 ENCOUNTER — OCCUPATIONAL MEDICINE (OUTPATIENT)
Dept: OCCUPATIONAL MEDICINE | Facility: CLINIC | Age: 55
End: 2025-06-17
Payer: COMMERCIAL

## 2025-06-17 VITALS
RESPIRATION RATE: 16 BRPM | OXYGEN SATURATION: 100 % | DIASTOLIC BLOOD PRESSURE: 60 MMHG | WEIGHT: 201 LBS | TEMPERATURE: 97.5 F | HEART RATE: 60 BPM | BODY MASS INDEX: 37.95 KG/M2 | HEIGHT: 61 IN | SYSTOLIC BLOOD PRESSURE: 104 MMHG

## 2025-06-17 DIAGNOSIS — S46.911D STRAIN OF RIGHT SHOULDER, SUBSEQUENT ENCOUNTER: Primary | ICD-10-CM

## 2025-06-17 PROCEDURE — 99214 OFFICE O/P EST MOD 30 MIN: CPT | Performed by: FAMILY MEDICINE

## 2025-06-17 RX ORDER — NAPROXEN 500 MG/1
TABLET ORAL
Qty: 14 TABLET | Refills: 0 | Status: SHIPPED | OUTPATIENT
Start: 2025-06-17

## 2025-06-17 ASSESSMENT — PAIN SCALES - GENERAL: PAINLEVEL_OUTOF10: 7=MODERATE-SEVERE PAIN

## 2025-06-17 NOTE — LETTER
PHYSICIAN’S AND CHIROPRACTIC PHYSICIAN'S   PROGRESS REPORT   CERTIFICATION OF DISABILITY Claim Number:     Social Security Number:    Patient’s Name: Desiree Pratt Date of Injury: 5/26/2025   Employer:  [employer]  Name of MCO (if applicable):      Patient’s Job Description/Occupation: Manager       Previous Injuries/Diseases/Surgeries Contributing to the Condition:  None known      Diagnosis: (D43.203B) Strain of right shoulder, subsequent encounter  (primary encounter diagnosis)      Related to the Industrial Injury? Yes     Explain: Hurt right shoulder picking up a bucket      Objective Medical Findings: Right shoulder pain         None - Discharged                         Stable  No                 Ratable  No        Generally Improved                         Condition Worsened               X   Condition Same  May Have Suffered a Permanent Disability No     Treatment Plan:              No Change in Therapy                  PT/OT Prescribed                      Medication May be Used While Working        Case Management                          PT/OT Discontinued    Consultation    Further Diagnostic Studies:    Prescription(s)      MRI    Naproxen     Released to FULL DUTY /No Restrictions on (Date):       Certified TOTALLY TEMPORARILY DISABLED (Indicate Dates) From:   To:    X  Released to RESTRICTED/Modified Duty on (Date): From: 6/17/2025 To: 6/26/2025  Restrictions Are:  Temporary      No Sitting    No Standing    No Pulling Other: No use of right arm       No Bending at Waist     No Stooping     No Lifting        No Carrying     No Walking Lifting Restricted to (lbs.):          No Pushing        No Climbing     No Reaching Above Shoulders       Date of Next Visit:  06/26/2025  @@1:30 PM  Date of this Exam: 6/17/2025 Physician/Chiropractic Physician Name: Freddie Avalos M.D. Physician/Chiropractic Physician Signature:  Jon Bowden DO MPH     [place]:  [address] [place] [address]  [address] [place], [place] [address] - Telephone [phone] [place]:  [address], [address] [place], [place] [address] - Telephone [phone]    [email or web address]  D-39 (Rev. 10/24)

## 2025-06-17 NOTE — LETTER
PHYSICIAN’S AND CHIROPRACTIC PHYSICIAN'S   PROGRESS REPORT   CERTIFICATION OF DISABILITY Claim Number:     Social Security Number:    Patient’s Name: Desiree Pratt Date of Injury: 5/26/2025   Employer:   Name of MCO (if applicable):      Patient’s Job Description/Occupation: Manager       Previous Injuries/Diseases/Surgeries Contributing to the Condition:  None known      Diagnosis: (K74.500E) Strain of right shoulder, subsequent encounter  (primary encounter diagnosis)      Related to the Industrial Injury? Yes     Explain: Hurt right shoulder picking up a bucket      Objective Medical Findings: Right shoulder pain         None - Discharged                         Stable  No                 Ratable  No        Generally Improved                         Condition Worsened               X   Condition Same  May Have Suffered a Permanent Disability No     Treatment Plan:              No Change in Therapy                  PT/OT Prescribed                      Medication May be Used While Working        Case Management                          PT/OT Discontinued    Consultation    Further Diagnostic Studies:    Prescription(s)      MRI    Naproxen     Released to FULL DUTY /No Restrictions on (Date):       Certified TOTALLY TEMPORARILY DISABLED (Indicate Dates) From:   To:    X  Released to RESTRICTED/Modified Duty on (Date): From: 6/17/2025 To: 6/24/2025  Restrictions Are:  Temporary      No Sitting    No Standing    No Pulling Other: No use of right arm       No Bending at Waist     No Stooping     No Lifting        No Carrying     No Walking Lifting Restricted to (lbs.):          No Pushing        No Climbing     No Reaching Above Shoulders       Date of Next Visit:  6/24/2025 Date of this Exam: 6/17/2025 Physician/Chiropractic Physician Name: Freddie Avalos M.D. Physician/Chiropractic Physician Signature:  Jon Bowden DO MPH     [place]:  [address] [place] [address], [address] [place], [place]  [address] - Telephone [phone] [place]:  [address] [place] [address], [address] [place], [place] [address] - Telephone [phone]    [email or web address]  D-39 (Rev. 10/24)

## 2025-06-17 NOTE — PROGRESS NOTES
"Subjective     Desiree Pratt is a 55 y.o. female who presents with Other ((WC DOI 5/26/2025 R SHOULDER/ SAME) RM 17)  \" Desiree Pratt is a 55 y.o. female who presents to urgent care with concerns for work-related injury.  Patient reports that on 5/26/2025 she began experiencing right-sided shoulder pain after scooping out food debris from a clogged floor drain.  Patient reports while removing debris she felt a sudden pop as well as a sharp pain to her right shoulder.  She reports that pain radiates down to her right bicep.  Does endorse occasional tingling with movement.  No numbness.  Patient is right-hand dominant.  No previous history of shoulder injury.  No second job \"    ** Today 6/17/2025 : 2nd visit, is 0% improvement since prior last visit and initial injury.  Says was lifting a bucket and felt a pop in the right shoulder and has been having some anterior pain since in the right shoulder sometimes in the posterior shoulder and has difficulty using it.          HPI    Review of Systems   Musculoskeletal:  Positive for joint pain.   All other systems reviewed and are negative.             Objective     /60 (BP Location: Left arm, Patient Position: Sitting, BP Cuff Size: Large adult)   Pulse 60   Temp 36.4 °C (97.5 °F) (Temporal)   Resp 16   Ht 1.549 m (5' 1\")   Wt 91.2 kg (201 lb)   SpO2 100%   BMI 37.98 kg/m²      Physical Exam  Vitals and nursing note reviewed.   Constitutional:       General: She is not in acute distress.     Appearance: Normal appearance. She is well-developed. She is not ill-appearing, toxic-appearing or diaphoretic.   HENT:      Head: Normocephalic.   Pulmonary:      Effort: Pulmonary effort is normal. No respiratory distress.   Musculoskeletal:        Arms:       Comments: Right shoulder without any obvious deformity ecchymoses discoloration edema.  Some tenderness over the bicipital groove.  Some tenderness and pain reproduced when stressing " bicep tendon.  Can flex and abduct to about 90 degrees limb is grossly neurovascularly intact   Neurological:      Mental Status: She is alert.      Motor: No abnormal muscle tone.   Psychiatric:         Mood and Affect: Mood normal.         Behavior: Behavior normal.         1. Strain of right shoulder, subsequent encounter  naproxen (NAPROSYN) 500 MG Tab    Referral to Radiology    MR-SHOULDER-W/O RIGHT      DX-SHOULDER 2+ RIGHT  Result Date: 6/5/2025 6/5/2025 6:07 PM HISTORY/REASON FOR EXAM:  Pain/Deformity Following Trauma. RIGHT shoulder strain. TECHNIQUE/EXAM DESCRIPTION AND NUMBER OF VIEWS:  3 views of the RIGHT shoulder. COMPARISON: None FINDINGS: Clavicle is intact.  AC joint is preserved. Visualized proximal humerus is intact and normally located. Visualized RIGHT chest is unremarkable.   No fracture or dislocation of RIGHT shoulder.     No use of right arm    1 week recheck, sooner if needed    Prescription NSAIDs    MRI rule out bicep or rotator cuff tear

## 2025-06-25 NOTE — Clinical Note
REFERRAL APPROVAL NOTICE         Sent on June 25, 2025                   Desiree Pratt  [address] [place] [address]                   Dear MsMikayla Dhiraj Terence,    After a careful review of the medical information and benefit coverage, [facility] has processed your referral. See below for additional details.    If applicable, you must be actively enrolled with your insurance for coverage of the authorized service. If you have any questions regarding your coverage, please contact your insurance directly.    REFERRAL INFORMATION   Referral #:  [id number]  Referred-To Department    Referred-By Provider:  Orthopedics    JOHANNE Arizmendi Main Totals (joint)      [address] [id number] [phone] [address] [place] [id number] [phone]    Referral Start Date:  06/05/2025  Referral End Date:   06/05/2026             SCHEDULING  If you do not already have an appointment, please call [phone] to make an appointment.     MORE INFORMATION  If you do not already have a [employer] account, sign up at: [email or web address]  You can access your medical information, make appointments, see lab results, billing information, and more.  If you have questions regarding this referral, please contact  the [facility] Referrals department at:             [phone]. Monday - Friday 8:00AM - 5:00PM.     Sincerely,    [facility]

## 2025-06-26 ENCOUNTER — OCCUPATIONAL MEDICINE (OUTPATIENT)
Dept: OCCUPATIONAL MEDICINE | Facility: CLINIC | Age: 55
End: 2025-06-26
Payer: COMMERCIAL

## 2025-06-26 VITALS
RESPIRATION RATE: 16 BRPM | SYSTOLIC BLOOD PRESSURE: 130 MMHG | TEMPERATURE: 98.4 F | BODY MASS INDEX: 37.98 KG/M2 | HEIGHT: 61 IN | OXYGEN SATURATION: 96 % | HEART RATE: 74 BPM | DIASTOLIC BLOOD PRESSURE: 84 MMHG

## 2025-06-26 DIAGNOSIS — S46.911D STRAIN OF RIGHT SHOULDER, SUBSEQUENT ENCOUNTER: Primary | ICD-10-CM

## 2025-06-26 PROCEDURE — 99214 OFFICE O/P EST MOD 30 MIN: CPT | Performed by: NURSE PRACTITIONER

## 2025-06-26 RX ORDER — CYCLOBENZAPRINE HCL 5 MG
10 TABLET ORAL 3 TIMES DAILY PRN
Qty: 30 TABLET | Refills: 0 | Status: SHIPPED | OUTPATIENT
Start: 2025-06-26

## 2025-06-26 ASSESSMENT — PAIN SCALES - GENERAL: PAINLEVEL_OUTOF10: 7=MODERATE-SEVERE PAIN

## 2025-06-26 NOTE — PROGRESS NOTES
"Subjective:     Desiree Pratt is a 55 y.o. female who presents for Follow-Up ([facility] - Exam Room 17/)    DOI:  5/26/2025 DIANE: she began experiencing right-sided shoulder pain after scooping out food debris from a clogged floor drain.  Patient reports while removing debris she felt a sudden pop as well as a sharp pain to her right shoulder.      Today patient states symptoms have not improved they are starting to feel worse.  She states that the pain is so severe she can barely move her arm she is keeping it pretty isolated in one spot.  She reports that pain radiates down to her right bicep.  Does endorse occasional tingling with movement.  No numbness.  Patient is right-hand dominant.  She is using a topical Tylenol arthritis rub, naproxen, and heat which only helped relieve symptoms slightly.  She will use the sling occasionally but since it is more aggravating she tries not to use it frequently.  She is currently tolerating light duty with minimal difficulty.  She was contacted earlier today to get scheduled for her MRI.  She has been approved to see the orthopedist but is pending the MRI.  Plan of care discussed with patient.    ROS: All systems were reviewed on intake form, form was reviewed and signed. See scanned documents in media. Pertinent positives and negatives included in HPI.    PMH: No pertinent past medical history to this problem  MEDS: Medications were reviewed in Epic  ALLERGIES: Allergies[1]  SOCHX: Works as manager at [employer]  FH: No pertinent family history to this problem       Objective:     /84   Pulse 74   Temp 36.9 °C (98.4 °F) (Temporal)   Resp 16   Ht 1.549 m (5' 1\")   SpO2 96%   BMI 37.98 kg/m²     [unfilled]     Right shoulder without any obvious deformity ecchymoses discoloration edema.  Pinpoint tenderness over the bicipital groove.  Pinpoint tenderness to palpation to the ACJ and GHJ.  Pain with Apley scratch test.  Tenderness and pain reproduced " when stressing bicep tendon.  Can flex and abduct to about 90 degrees with pain.  Distal neurovascular sensation intact.   strength is 3/5.       Assessment/Plan:       1. Strain of right shoulder, subsequent encounter  - cyclobenzaprine (FLEXERIL) 5 MG tablet; Take 2 Tablets by mouth 3 times a day as needed for Moderate Pain.  Dispense: 30 Tablet; Refill: 0      Limited use of right upper extremity until cleared by the orthopedics       Differential diagnosis, natural history, supportive care, and indications for immediate follow-up discussed.    Approximately 30 minutes were spent in reviewing notes, preparing for visit, obtaining history, exam and evaluation, patient counseling/education and post visit documentation/orders.         [1] No Known Allergies

## 2025-06-26 NOTE — LETTER
PHYSICIAN’S AND CHIROPRACTIC PHYSICIAN'S   PROGRESS REPORT   CERTIFICATION OF DISABILITY Claim Number:     Social Security Number:    Patient’s Name: Desiree Pratt Date of Injury: 5/26/2025   Employer: [employer]  Name of MCO (if applicable):      Patient’s Job Description/Occupation: Manager       Previous Injuries/Diseases/Surgeries Contributing to the Condition:         Diagnosis: (S46.403E) Strain of right shoulder, subsequent encounter  (primary encounter diagnosis)      Related to the Industrial Injury? Yes     Explain: DOI:  5/26/2025 DIANE: she began experiencing right-sided shoulder pain after scooping out food debris from a clogged floor drain.  Patient reports while removing debris she felt a sudden pop as well as a sharp pain to her right shoulder.       Objective Medical Findings:  Right shoulder without any obvious deformity ecchymoses discoloration edema.  Pinpoint tenderness over the bicipital groove.  Pinpoint tenderness to palpation to the ACJ and GHJ.  Pain with Apley scratch test.  Tenderness and pain reproduced when stressing bicep tendon.  Can flex and abduct to about 90 degrees with pain.  Distal neurovascular sensation intact.   strength is 3/5.            None - Discharged                         Stable  Yes                 Ratable  No        Generally Improved                      X   Condition Worsened               X   Condition Same  May Have Suffered a Permanent Disability No     Treatment Plan:    Follow-up in 5 weeks, unless seen by orthopedics  Continue with OTC ointment for your choosing, ice/heat application, stretching, wall walks, pendulum swings, and gentle ROM as tolerated  Shoulder sling as needed otherwise wean as tolerated  Return to clinic sooner if needed for further evaluation and management of new or worsening symptoms           No Change in Therapy                  PT/OT Prescribed                      Medication May be Used While Working         Case Management                          PT/OT Discontinued    Consultation    Further Diagnostic Studies:    Prescription(s) Orthopedic referral approved pending scheduling, transfer care    MRI approved pending scheduling    Continue with naproxen as prescribed  Take cyclobenzaprine as prescribed do not drive or work while taking this medication due to potential sedating effects     Released to FULL DUTY /No Restrictions on (Date):       Certified TOTALLY TEMPORARILY DISABLED (Indicate Dates) From:   To:    X  Released to RESTRICTED/Modified Duty on (Date): From: 6/26/2025 To: 7/31/2025   Restrictions Are:  Temporary      No Sitting    No Standing    No Pulling Other: Limited use of right upper extremity until cleared by the orthopedics       No Bending at Waist     No Stooping     No Lifting        No Carrying     No Walking Lifting Restricted to (lbs.):          No Pushing        No Climbing     No Reaching Above Shoulders       Date of Next Visit:  Thursday 7/31/2025  @ 1:30 PM  *Cancel if seen by orthopedics*  Date of this Exam: 6/26/2025 Physician/Chiropractic Physician Name: JOHANNE Gaming Physician/Chiropractic Physician Signature:  Jon Bowden DO MPH     [place]:  [address] [place] [address], [address] [place], [place] [address] - Telephone [phone] [place]:  [address] [place] [address], [address] [place], [place] [address] - Telephone [phone]    [email or web address]  D-39 (Rev. 10/24)

## 2025-07-30 ENCOUNTER — TELEPHONE (OUTPATIENT)
Dept: OCCUPATIONAL MEDICINE | Facility: CLINIC | Age: 55
End: 2025-07-30
Payer: COMMERCIAL

## 2025-07-31 ENCOUNTER — OCCUPATIONAL MEDICINE (OUTPATIENT)
Dept: OCCUPATIONAL MEDICINE | Facility: CLINIC | Age: 55
End: 2025-07-31
Payer: COMMERCIAL

## 2025-07-31 VITALS
HEIGHT: 61 IN | SYSTOLIC BLOOD PRESSURE: 126 MMHG | RESPIRATION RATE: 14 BRPM | BODY MASS INDEX: 36.82 KG/M2 | WEIGHT: 195 LBS | OXYGEN SATURATION: 95 % | HEART RATE: 95 BPM | DIASTOLIC BLOOD PRESSURE: 84 MMHG | TEMPERATURE: 98.4 F

## 2025-07-31 DIAGNOSIS — S46.911D STRAIN OF RIGHT SHOULDER, SUBSEQUENT ENCOUNTER: Primary | ICD-10-CM

## 2025-07-31 ASSESSMENT — PAIN SCALES - GENERAL: PAINLEVEL_OUTOF10: 4=SLIGHT-MODERATE PAIN

## 2025-07-31 ASSESSMENT — ENCOUNTER SYMPTOMS
RESPIRATORY NEGATIVE: 1
CARDIOVASCULAR NEGATIVE: 1
CONSTITUTIONAL NEGATIVE: 1
SENSORY CHANGE: 0
MYALGIAS: 1
WEAKNESS: 1
TINGLING: 0
PSYCHIATRIC NEGATIVE: 1

## 2025-07-31 NOTE — LETTER
PHYSICIAN’S AND CHIROPRACTIC PHYSICIAN'S   PROGRESS REPORT   CERTIFICATION OF DISABILITY Claim Number:     Social Security Number:    Patient’s Name: Desiree Pratt Date of Injury: 5/26/2025   Employer: [employer] Name of MCO (if applicable):      Patient’s Job Description/Occupation: Manager       Previous Injuries/Diseases/Surgeries Contributing to the Condition:         Diagnosis: (S43.958D) Strain of right shoulder, subsequent encounter  (primary encounter diagnosis)      Related to the Industrial Injury? Yes     Explain: DOI:  5/26/2025 DIANE: she began experiencing right-sided shoulder pain after scooping out food debris from a clogged floor drain.  Patient reports while removing debris she felt a sudden pop as well as a sharp pain to her right shoulder.        Objective Medical Findings:  Right shoulder without any obvious deformity ecchymoses discoloration edema.  Pinpoint tenderness over the bicipital groove.  Pinpoint tenderness to palpation to the ACJ and GHJ.  Pain with Apley scratch test.  Tenderness and pain reproduced when stressing bicep tendon.  Can flex and abduct to about 90 degrees with pain.  Distal neurovascular sensation intact.   strength is 3/5.            None - Discharged                         Stable  Yes                 Ratable  No        Generally Improved                         Condition Worsened               X   Condition Same  May Have Suffered a Permanent Disability No     Treatment Plan:      Follow-up in 4 weeks, unless seen by orthopedics  Continue with OTC ointment for your choosing, ice/heat application, stretching, wall walks, pendulum swings, and gentle ROM as tolerated  Shoulder sling as needed otherwise wean as tolerated  Return to clinic sooner if needed for further evaluation and management of new or worsening symptoms           No Change in Therapy                  PT/OT Prescribed                      Medication May be Used While Working         Case Management                          PT/OT Discontinued  X  Consultation    Further Diagnostic Studies:    Prescription(s) Orthopedic referral approved pending scheduling, transfer care    MRI right shoulder: Partial-thickness tear with near full-thickness tear at the anterior insertion of the supraspinatus tendon.  Small tear of the anterior superior labrum without involvement of the biceps labral anchor.    Continue with naproxen as prescribed  Take cyclobenzaprine as prescribed do not drive or work while taking this medication due to potential sedating effects       Released to FULL DUTY /No Restrictions on (Date):       Certified TOTALLY TEMPORARILY DISABLED (Indicate Dates) From:   To:    X  Released to RESTRICTED/Modified Duty on (Date): From: 7/31/2025 To:  8/28/2025  Restrictions Are:  Temporary      No Sitting    No Standing    No Pulling Other:  Limited use of right upper extremity until cleared by the orthopedics       No Bending at Waist     No Stooping     No Lifting        No Carrying     No Walking Lifting Restricted to (lbs.):          No Pushing        No Climbing     No Reaching Above Shoulders       Date of Next Visit:   8/28/205 AT 1:45 PM Date of this Exam: 7/31/2025 Physician/Chiropractic Physician Name: JOHANNE Gaming Physician/Chiropractic Physician Signature:  Jon Bowden DO MPH     [place]:  [address] [place] [address], [address] [place], [place] [address] - Telephone [phone] [place]:  [address] [place] [address], [address] [place], [place] [address] - Telephone [phone]    [email or web address]  D-39 (Rev. 10/24)

## 2025-07-31 NOTE — PROGRESS NOTES
"Subjective:     Desiree Pratt is a 55 y.o. female who presents for Follow-Up    DOI:  5/26/2025 DIANE: she began experiencing right-sided shoulder pain after scooping out food debris from a clogged floor drain.  Patient reports while removing debris she felt a sudden pop as well as a sharp pain to her right shoulder.       Today patient states symptoms have not improved they are starting to feel worse.  She states that the pain is so severe she can barely move her arm, especially on days that she has to use it a little more than normal.  She reports that pain radiates down to her right bicep.  Does endorse occasional tingling with movement.  No numbness.  Patient is right-hand dominant.  She is using a topical Tylenol arthritis rub, naproxen, and heat which only helped relieve symptoms slightly.  She did try the muscle relaxer did help her sleep but he is only taking it once.  She is currently tolerating light duty with minimal difficulty.  MRI results reviewed with patient.  She has been approved to see the orthopedist but is pending the MRI.  Plan of care discussed with patient.    Review of Systems   Constitutional: Negative.    Respiratory: Negative.     Cardiovascular: Negative.    Musculoskeletal:  Positive for joint pain and myalgias.   Skin: Negative.    Neurological:  Positive for weakness. Negative for tingling and sensory change.   Psychiatric/Behavioral: Negative.         SOCHX: Works as manager at [employer]  FH: No pertinent family history to this problem       Objective:     /84   Pulse 95   Temp 36.9 °C (98.4 °F) (Temporal)   Resp 14   Ht 1.549 m (5' 1\")   Wt 88.5 kg (195 lb)   SpO2 95%   BMI 36.84 kg/m²     Constitutional: Patient is in no acute distress. Appears well-developed and well-nourished.   Cardiovascular: Normal rate.    Pulmonary/Chest: Effort normal. No respiratory distress.   Neurological: Patient is alert and oriented to person, place, and time.   Skin: " Skin is warm and dry.   Psychiatric: Normal mood and affect. Behavior is normal.      Right shoulder without any obvious deformity ecchymoses discoloration edema.  Pinpoint tenderness over the bicipital groove.  Pinpoint tenderness to palpation to the ACJ and GHJ.  Pain with Apley scratch test.  Tenderness and pain reproduced when stressing bicep tendon.  Can flex and abduct to about 90 degrees with pain.  Distal neurovascular sensation intact.   strength is 3/5.       Assessment/Plan:       1. Strain of right shoulder, subsequent encounter      Follow-up in 4 weeks, unless seen by orthopedics  Continue with OTC ointment for your choosing, ice/heat application, stretching, wall walks, pendulum swings, and gentle ROM as tolerated  Shoulder sling as needed otherwise wean as tolerated  Return to clinic sooner if needed for further evaluation and management of new or worsening symptoms            Differential diagnosis, natural history, supportive care, and indications for immediate follow-up discussed.    Approximately 25 minutes was spent in preparing for visit, obtaining history, exam and evaluation, patient counseling/education and post visit documentation/orders.

## 2025-08-27 ENCOUNTER — TELEPHONE (OUTPATIENT)
Dept: OCCUPATIONAL MEDICINE | Facility: CLINIC | Age: 55
End: 2025-08-27
Payer: COMMERCIAL